# Patient Record
Sex: FEMALE | Race: WHITE | NOT HISPANIC OR LATINO | URBAN - METROPOLITAN AREA
[De-identification: names, ages, dates, MRNs, and addresses within clinical notes are randomized per-mention and may not be internally consistent; named-entity substitution may affect disease eponyms.]

---

## 2020-09-16 ENCOUNTER — OFFICE VISIT (OUTPATIENT)
Dept: URBAN - METROPOLITAN AREA CLINIC 76 | Facility: CLINIC | Age: 50
End: 2020-09-16
Payer: COMMERCIAL

## 2020-09-16 DIAGNOSIS — T37.2X5A ADVERSE EFFECT OF ANTIMALARIAL, INITIAL ENCOUNTER: ICD-10-CM

## 2020-09-16 DIAGNOSIS — Z79.899 OTHER LONG TERM (CURRENT) DRUG THERAPY: ICD-10-CM

## 2020-09-16 DIAGNOSIS — H04.123 DRY EYE SYNDROME OF BILATERAL LACRIMAL GLANDS: ICD-10-CM

## 2020-09-16 DIAGNOSIS — M06.89 OTHER SPECIFIED RHEUMATOID ARTHRITIS, MULTIPLE SITES: Primary | ICD-10-CM

## 2020-09-16 PROCEDURE — 99204 OFFICE O/P NEW MOD 45 MIN: CPT | Performed by: OPTOMETRIST

## 2020-09-16 ASSESSMENT — KERATOMETRY
OD: 42.63
OS: 42.25

## 2020-09-16 ASSESSMENT — INTRAOCULAR PRESSURE
OS: 14
OD: 14

## 2020-09-16 NOTE — IMPRESSION/PLAN
Impression: Other long term (current) drug therapy: Z79.899. Bilateral. no toxicity found in today's exam. Plan: Discussed with patient. Continue to monitor. See plan #1.

## 2020-09-16 NOTE — IMPRESSION/PLAN
Impression: Dry eye syndrome of bilateral lacrimal glands: H04.123. Bilateral. Plan: Discussed diagnosis in detail with patient. Warm compresses with lid massage from top / down, bottom / up, and sweep from inside / out x 2. Patient instructed to use emulsive base lubricant 3-4 x a day, gave sample of refresh relieva. Increase omega foods/nuts and/or Borage oil supplement 1500-2500mg capsule orally per day.

## 2020-09-16 NOTE — IMPRESSION/PLAN
Impression: Other specified rheumatoid arthritis, multiple sites: M06.89. Bilateral. No toxicity found. Pt has been taking Plaquenil x 11 yrs. Plan: Discussed with patient. No toxicity found in today's exam. Continue Plaquenil per PCP/rheumatologist. Recommend VF, 5 line raster MAC OCT, and color plates.

## 2023-02-03 ENCOUNTER — OFFICE VISIT (OUTPATIENT)
Dept: URBAN - METROPOLITAN AREA CLINIC 76 | Facility: CLINIC | Age: 53
End: 2023-02-03
Payer: COMMERCIAL

## 2023-02-03 DIAGNOSIS — H25.13 AGE-RELATED NUCLEAR CATARACT, BILATERAL: ICD-10-CM

## 2023-02-03 DIAGNOSIS — Z79.899 OTHER LONG TERM (CURRENT) DRUG THERAPY: Primary | ICD-10-CM

## 2023-02-03 DIAGNOSIS — H52.4 PRESBYOPIA: ICD-10-CM

## 2023-02-03 DIAGNOSIS — H04.123 DRY EYE SYNDROME OF BILATERAL LACRIMAL GLANDS: ICD-10-CM

## 2023-02-03 DIAGNOSIS — T37.2X5A ADVERSE EFFECT OF ANTIMALARIAL, INITIAL ENCOUNTER: ICD-10-CM

## 2023-02-03 DIAGNOSIS — M06.89 OTHER SPECIFIED RHEUMATOID ARTHRITIS, MULTIPLE SITES: ICD-10-CM

## 2023-02-03 PROCEDURE — 99214 OFFICE O/P EST MOD 30 MIN: CPT | Performed by: OPTOMETRIST

## 2023-02-03 ASSESSMENT — KERATOMETRY
OD: 42.50
OS: 42.25

## 2023-02-03 ASSESSMENT — INTRAOCULAR PRESSURE
OS: 18
OD: 17

## 2024-02-07 ENCOUNTER — OFFICE VISIT (OUTPATIENT)
Dept: URBAN - METROPOLITAN AREA CLINIC 76 | Facility: CLINIC | Age: 54
End: 2024-02-07
Payer: MEDICARE

## 2024-02-07 DIAGNOSIS — H25.13 AGE-RELATED NUCLEAR CATARACT, BILATERAL: Primary | ICD-10-CM

## 2024-02-07 DIAGNOSIS — H52.4 PRESBYOPIA: ICD-10-CM

## 2024-02-07 DIAGNOSIS — M06.89 OTHER SPECIFIED RHEUMATOID ARTHRITIS, MULTIPLE SITES: ICD-10-CM

## 2024-02-07 DIAGNOSIS — Z79.899 OTHER LONG TERM (CURRENT) DRUG THERAPY: ICD-10-CM

## 2024-02-07 PROCEDURE — 92014 COMPRE OPH EXAM EST PT 1/>: CPT | Performed by: OPTOMETRIST

## 2024-02-07 ASSESSMENT — VISUAL ACUITY
OS: 20/20
OD: 20/20

## 2024-02-07 ASSESSMENT — INTRAOCULAR PRESSURE
OD: 16
OS: 16

## 2024-02-07 ASSESSMENT — KERATOMETRY
OD: 42.88
OS: 42.25